# Patient Record
Sex: FEMALE | Race: OTHER | Employment: PART TIME | URBAN - METROPOLITAN AREA
[De-identification: names, ages, dates, MRNs, and addresses within clinical notes are randomized per-mention and may not be internally consistent; named-entity substitution may affect disease eponyms.]

---

## 2021-03-01 DIAGNOSIS — Z23 ENCOUNTER FOR IMMUNIZATION: ICD-10-CM

## 2021-03-03 ENCOUNTER — IMMUNIZATIONS (OUTPATIENT)
Dept: FAMILY MEDICINE CLINIC | Facility: HOSPITAL | Age: 51
End: 2021-03-03

## 2021-03-03 DIAGNOSIS — Z23 ENCOUNTER FOR IMMUNIZATION: Primary | ICD-10-CM

## 2021-03-03 PROCEDURE — 0001A SARS-COV-2 / COVID-19 MRNA VACCINE (PFIZER-BIONTECH) 30 MCG: CPT

## 2021-03-03 PROCEDURE — 91300 SARS-COV-2 / COVID-19 MRNA VACCINE (PFIZER-BIONTECH) 30 MCG: CPT

## 2021-03-24 ENCOUNTER — IMMUNIZATIONS (OUTPATIENT)
Dept: FAMILY MEDICINE CLINIC | Facility: HOSPITAL | Age: 51
End: 2021-03-24

## 2021-03-24 DIAGNOSIS — Z23 ENCOUNTER FOR IMMUNIZATION: Primary | ICD-10-CM

## 2021-03-24 PROCEDURE — 91300 SARS-COV-2 / COVID-19 MRNA VACCINE (PFIZER-BIONTECH) 30 MCG: CPT

## 2021-03-24 PROCEDURE — 0002A SARS-COV-2 / COVID-19 MRNA VACCINE (PFIZER-BIONTECH) 30 MCG: CPT

## 2021-08-13 ENCOUNTER — OFFICE VISIT (OUTPATIENT)
Dept: URGENT CARE | Facility: MEDICAL CENTER | Age: 51
End: 2021-08-13
Payer: COMMERCIAL

## 2021-08-13 VITALS
OXYGEN SATURATION: 98 % | DIASTOLIC BLOOD PRESSURE: 65 MMHG | SYSTOLIC BLOOD PRESSURE: 134 MMHG | BODY MASS INDEX: 23.92 KG/M2 | TEMPERATURE: 98.5 F | RESPIRATION RATE: 16 BRPM | HEIGHT: 63 IN | HEART RATE: 72 BPM | WEIGHT: 135 LBS

## 2021-08-13 DIAGNOSIS — S60.562A INSECT BITE OF LEFT HAND, INITIAL ENCOUNTER: Primary | ICD-10-CM
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
## 2021-08-13 PROCEDURE — 99204 OFFICE O/P NEW MOD 45 MIN: CPT | Performed by: PHYSICIAN ASSISTANT

## 2021-08-13 RX ORDER — ESCITALOPRAM OXALATE 10 MG/1
10 TABLET ORAL DAILY
COMMUNITY
Start: 2021-05-14

## 2021-08-13 RX ORDER — TRAZODONE HYDROCHLORIDE 50 MG/1
TABLET ORAL
COMMUNITY
Start: 2021-08-12

## 2021-08-13 RX ORDER — ESCITALOPRAM OXALATE 10 MG/1
TABLET ORAL
COMMUNITY
Start: 2021-07-27

## 2021-08-13 RX ORDER — TRAZODONE HYDROCHLORIDE 50 MG/1
50 TABLET ORAL
COMMUNITY
Start: 2021-05-14

## 2021-08-13 RX ORDER — PREDNISONE 10 MG/1
TABLET ORAL
Qty: 18 TABLET | Refills: 0 | Status: SHIPPED | OUTPATIENT
Start: 2021-08-13

## 2021-08-13 NOTE — PROGRESS NOTES
[address]        NAME: Nicolette Lui is a 48 y o  female  : 1970    MRN: [id number]  DATE: 2021  TIME: 6:31 PM    Assessment and Plan   No primary diagnosis found  No diagnosis found  Patient Instructions       Follow up with PCP in 3-5 days  Chief Complaint     Chief Complaint   Patient presents with    Insect Bite     Bee sting on left hand that happened 2 days ago  History of Present Illness         Patient was stung by a bee 2 days ago   And her left hand is swelling  She is right-hand dominant  Insect Bite  This is a new problem  The current episode started yesterday  The problem occurs constantly  The problem has been gradually worsening  Pertinent negatives include no abdominal pain, anorexia, arthralgias, change in bowel habit, chest pain, chills, congestion, coughing, diaphoresis, fatigue, fever, headaches, joint swelling, myalgias, nausea, neck pain, numbness, rash, sore throat, swollen glands, urinary symptoms, vertigo, visual change, vomiting or weakness  Nothing aggravates the symptoms  Review of Systems   Review of Systems   Constitutional: Negative for chills, diaphoresis, fatigue and fever  HENT: Negative for congestion and sore throat  Respiratory: Negative for cough  Cardiovascular: Negative for chest pain  Gastrointestinal: Negative for abdominal pain, anorexia, change in bowel habit, nausea and vomiting  Musculoskeletal: Negative for arthralgias, joint swelling, myalgias and neck pain  Skin: Negative for rash  Neurological: Negative for vertigo, weakness, numbness and headaches  All other systems reviewed and are negative          Current Medications       Current Outpatient Medications:     escitalopram (LEXAPRO) 10 mg tablet, Take 10 mg by mouth daily, Disp: , Rfl:     traZODone (DESYREL) 50 mg tablet, Take 50 mg by mouth, Disp: , Rfl:     escitalopram (LEXAPRO) 10 mg tablet, , Disp: , Rfl:     traZODone (DESYREL) 50 mg tablet, , Disp: , Rfl:     Current Allergies     Allergies as of 08/13/2021    (No Known Allergies)            The following portions of the patient's history were reviewed and updated as appropriate: allergies, current medications, past family history, past medical history, past social history, past surgical history and problem list      No past medical history on file  No past surgical history on file  No family history on file  Medications have been verified  Objective   /65   Pulse 72   Temp 98 5 °F (36 9 °C)   Resp 16   Ht 5' 3" (1 6 m)   Wt 61 2 kg (135 lb)   SpO2 98%   BMI 23 91 kg/m²   No LMP recorded  Physical Exam     Physical Exam  Vitals and nursing note reviewed  Constitutional:       Appearance: Normal appearance  She is normal weight  Cardiovascular:      Rate and Rhythm: Normal rate and regular rhythm  Pulses: Normal pulses  Heart sounds: Normal heart sounds  Pulmonary:      Effort: Pulmonary effort is normal       Breath sounds: Normal breath sounds  Neurological:      Mental Status: She is alert  left hand +2 edema over the dorsum around the bite  Neurovascularly intact